# Patient Record
Sex: MALE | Race: OTHER | NOT HISPANIC OR LATINO | Employment: UNEMPLOYED | URBAN - METROPOLITAN AREA
[De-identification: names, ages, dates, MRNs, and addresses within clinical notes are randomized per-mention and may not be internally consistent; named-entity substitution may affect disease eponyms.]

---

## 2022-08-09 ENCOUNTER — OFFICE VISIT (OUTPATIENT)
Dept: PODIATRY | Facility: CLINIC | Age: 37
End: 2022-08-09

## 2022-08-09 VITALS — SYSTOLIC BLOOD PRESSURE: 141 MMHG | DIASTOLIC BLOOD PRESSURE: 93 MMHG | WEIGHT: 193 LBS | HEART RATE: 72 BPM

## 2022-08-09 DIAGNOSIS — L29.9 ITCH OF SKIN: Primary | ICD-10-CM

## 2022-08-09 DIAGNOSIS — B07.8 OTHER VIRAL WARTS: ICD-10-CM

## 2022-08-09 DIAGNOSIS — B35.1 ONYCHOMYCOSIS: ICD-10-CM

## 2022-08-09 DIAGNOSIS — L98.9 PSORIASIS-LIKE SKIN DISEASE: ICD-10-CM

## 2022-08-09 PROCEDURE — 99202 PR OFFICE/OUTPT VISIT, NEW, LEVL II, 15-29 MIN: ICD-10-PCS | Mod: 25,S$PBB,, | Performed by: PODIATRIST

## 2022-08-09 PROCEDURE — 17110 DESTRUCTION B9 LES UP TO 14: CPT | Mod: PBBFAC,PN | Performed by: PODIATRIST

## 2022-08-09 PROCEDURE — 99202 OFFICE O/P NEW SF 15 MIN: CPT | Mod: 25,S$PBB,, | Performed by: PODIATRIST

## 2022-08-09 PROCEDURE — 99999 PR PBB SHADOW E&M-NEW PATIENT-LVL III: ICD-10-PCS | Mod: PBBFAC,,, | Performed by: PODIATRIST

## 2022-08-09 PROCEDURE — 99999 PR PBB SHADOW E&M-NEW PATIENT-LVL III: CPT | Mod: PBBFAC,,, | Performed by: PODIATRIST

## 2022-08-09 PROCEDURE — 17110: ICD-10-PCS | Mod: S$PBB,,, | Performed by: PODIATRIST

## 2022-08-09 PROCEDURE — 99203 OFFICE O/P NEW LOW 30 MIN: CPT | Mod: PBBFAC,PN | Performed by: PODIATRIST

## 2022-08-09 NOTE — PROGRESS NOTES
Subjective:      Patient ID: Sanjay Encarnacion is a 36 y.o. male.    Chief Complaint: Foot Problem (Possible fungus)    Sanjay is a 36 y.o. male who presents to the clinic on referral from his daugther, Anneliese. He is complaining of itching R>L foot/arch & 'peeling thick skin' as well as thick discolored nails - has had it 10 yrs. On & off w/ OTC treatment. Recurrence last year of 'thick skin' on feet & then noticed larger lesions on knees also. 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 Sanjay is inquiring about treatment options.    History reviewed. No pertinent past medical history.   There is no problem list on file for this patient.    PCP: None     Objective:      Review of Systems   Constitutional: Negative for malaise/fatigue.   Cardiovascular: Negative for claudication and leg swelling.   Skin: Positive for color change, dry skin, itching, nail changes, rash and suspicious lesions. Negative for poor wound healing and unusual hair distribution.   Musculoskeletal: Negative for back pain, falls, joint pain, muscle weakness and myalgias.   Neurological: Negative for loss of balance, sensory change and weakness.   Psychiatric/Behavioral: The patient is not nervous/anxious.      Physical Exam  Vitals reviewed.   Constitutional:       General: He is in acute distress.      Appearance: He is well-developed, well-groomed and overweight.   Cardiovascular:      Pulses: Normal pulses.           Dorsalis pedis pulses are 2+ on the right side and 2+ on the left side.   Musculoskeletal:         General: No swelling, tenderness or signs of injury.      Right lower leg: No edema.      Left lower leg: No edema.   Feet:      Right foot:      Skin integrity: Callus and dry skin present. No skin breakdown or erythema.      Toenail Condition: Fungal disease present.     Left foot:      Skin integrity: Callus and dry skin present. No skin breakdown or erythema.      Toenail Condition: Fungal disease present.  Skin:     General: Skin  is warm and moist.      Capillary Refill: Capillary refill takes less than 2 seconds.      Findings: Lesion and rash present. No bruising or erythema. Rash is urticarial.      Comments: dome-shaped, wart-like patchy areas B/L ankle & knees w/ amorphous appearance & scaling; no erythema    Neurological:      Mental Status: He is alert and oriented to person, place, and time.      Sensory: No sensory deficit.      Motor: No weakness.      Gait: Gait normal.   Psychiatric:         Mood and Affect: Mood and affect normal. Mood is not anxious.         Behavior: Behavior normal. Behavior is cooperative.        Assessment:      Encounter Diagnoses   Name Primary?    Itch of skin Yes    Onychomycosis     Other viral warts     Psoriasis-like skin disease        Plan:       Sanjay was seen today for foot problem.    Diagnoses and all orders for this visit:    Itch of skin    Onychomycosis    Other viral warts    Psoriasis-like skin disease    I counseled the patient on his conditions, their implications and medical management. Differential Dx of verrucae vs psoriasis.    - Shoe inspection. Patient instructed on proper foot hygeine.     Use of OTC topical antiperspirant to B/L feet.    Instructions on use of OTC topical antifungal tx options for nails prn.     May use OTC topical steroid cream for itching.    Application of trichloracetic acid & Salinocaine w/ moleskin pads.     Follow-up 2-3 wks.prn.    May need referral to dermatology, pending next evaluation.

## 2022-08-09 NOTE — PATIENT INSTRUCTIONS
For your fungal infection:    1. Listerine mouthwash (yellow/gold) - soak for 5-10minutes daily (may re-use solution up to a week)    2. Lamisil (terbanafine) 1% antifungal cream daily after shower.         Dry solid antiperspirant to both feet after shower or soaking.    Over the counter steroid cream for itching.      Note:  Topical treatments come in many different forms, including lotions, shampoos and more. Two active ingredients, salicylic acid and coal tar, are approved by the FDA for the treatment of psoriasis and can be found in a variety of treatments. Other products may contain substances such as aloe vera, jojoba, zinc pyrithione and capsaicin, which are used to moisturize, soothe, remove scale or relieve itching.

## 2022-08-15 ENCOUNTER — TELEPHONE (OUTPATIENT)
Dept: PODIATRY | Facility: CLINIC | Age: 37
End: 2022-08-15

## 2022-08-15 NOTE — TELEPHONE ENCOUNTER
----- Message from Kylee Hilliard DPM sent at 8/12/2022 11:48 AM CDT -----  Regarding: RE: Prescription  Contact: [phone]  It is OTC.    ----- Message -----  From: Cornelia Mccloud LPN  Sent: 8/11/2022   3:17 PM CDT  To: Kylee Hilliard DPM  Subject: FW: Prescription                                 Are you sending Rx for Lamisil (terbanafine) 1% antifungal cream? Or just OTC  ----- Message -----  From: Cornelia Martin  Sent: 8/11/2022   3:08 PM CDT  To: [facility] MARTIN Staff  Subject: Prescription                                     Calling in regards getting prescription for Lamisil (terbanafine) 1% antifungal cream daily after shower sent to pharmacy. Please call to confirm.    [facility] [address]  [address] [place] [id number]  Phone: [phone] Fax: [phone]

## 2022-08-21 NOTE — PROCEDURES
Destruction of Benign Lesion/wart vs psoriatic plaques    Date/Time: 8/9/2022 11:00 AM  Performed by: Kylee Hilliard DPM  Authorized by: Kylee Hilliard DPM     Consent Done?:  Yes (Verbal)  Indications:     other  Procedure Details:     Cosmetic?: No      Number of lesions:  2    Destruction method:  Other    Sterile dressings:  Other (comments)    Bleeding:  None     Patient tolerated the procedure well with no immediate complications.   Chemical cautery includes Trichloracetic acid & Salinocaine under occlusion w/ a moleskin pad. Patient to keep area CDI 48 hrs.to tolerance. Return for serial treatment until resolution prn.

## 2022-08-30 ENCOUNTER — TELEPHONE (OUTPATIENT)
Dept: PODIATRY | Facility: CLINIC | Age: 37
End: 2022-08-30

## 2022-08-30 NOTE — TELEPHONE ENCOUNTER
----- Message from Ana Vaca sent at 8/30/2022 10:48 AM CDT -----  Patient states prescription was never called in. Please call him. Thanks

## 2022-08-31 ENCOUNTER — OFFICE VISIT (OUTPATIENT)
Dept: PODIATRY | Facility: CLINIC | Age: 37
End: 2022-08-31

## 2022-08-31 DIAGNOSIS — L29.9 ITCHING: ICD-10-CM

## 2022-08-31 DIAGNOSIS — L98.9 PSORIASIS-LIKE SKIN DISEASE: ICD-10-CM

## 2022-08-31 DIAGNOSIS — B07.8 OTHER VIRAL WARTS: Primary | ICD-10-CM

## 2022-08-31 DIAGNOSIS — B35.3 TINEA PEDIS OF BOTH FEET: ICD-10-CM

## 2022-08-31 PROCEDURE — 99213 OFFICE O/P EST LOW 20 MIN: CPT | Mod: S$PBB,,, | Performed by: PODIATRIST

## 2022-08-31 PROCEDURE — 99211 OFF/OP EST MAY X REQ PHY/QHP: CPT | Mod: PBBFAC,PN | Performed by: PODIATRIST

## 2022-08-31 PROCEDURE — 99999 PR PBB SHADOW E&M-EST. PATIENT-LVL I: CPT | Mod: PBBFAC,,, | Performed by: PODIATRIST

## 2022-08-31 PROCEDURE — 99213 PR OFFICE/OUTPT VISIT, EST, LEVL III, 20-29 MIN: ICD-10-PCS | Mod: S$PBB,,, | Performed by: PODIATRIST

## 2022-08-31 PROCEDURE — 99999 PR PBB SHADOW E&M-EST. PATIENT-LVL I: ICD-10-PCS | Mod: PBBFAC,,, | Performed by: PODIATRIST

## 2022-08-31 RX ORDER — CLOTRIMAZOLE AND BETAMETHASONE DIPROPIONATE 10; .64 MG/G; MG/G
CREAM TOPICAL 2 TIMES DAILY
Qty: 45 G | Refills: 1 | Status: SHIPPED | OUTPATIENT
Start: 2022-08-31

## 2022-08-31 RX ORDER — CALCIPOTRIENE, BETAMETHASONE DIPROPIONATE 50; .643 UG/G; MG/G
OINTMENT TOPICAL DAILY
Qty: 100 G | Refills: 1 | Status: SHIPPED | OUTPATIENT
Start: 2022-08-31

## 2022-08-31 NOTE — PROGRESS NOTES
Subjective:      Patient ID: Sanjay Encarnacion is a 36 y.o. male.    Chief Complaint: No chief complaint on file.    Sanjay is a 36 y.o. male who presents for follow-up of itching R>L foot/arch & 'peeling thick skin' as well as thick discolored nails - has had it 10 yrs. On & off w/ OTC treatment. Recurrence last year of 'thick skin' on feet  & then noticed larger lesions on knees also. 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  Patient remembers that there were 2 things that he needed to do but thought they were prescriptions so has not been doing anything thus far-it was supposed to be a topical antiperspirant for hyperhidrosis and antifungal. Still has itching but improved appearance to areas of concern status post topical chemical cautery last visit.    No past medical history on file.   There is no problem list on file for this patient.    PCP: None     Objective:      Physical Exam  Vitals reviewed.   Constitutional:       Appearance: He is well-developed, well-groomed and overweight.   Cardiovascular:      Pulses: Normal pulses.           Dorsalis pedis pulses are 2+ on the right side and 2+ on the left side.   Musculoskeletal:         General: No swelling or tenderness.   Feet:      Right foot:      Skin integrity: Dry skin present. No skin breakdown, erythema or callus. Right foot fissure: Left knee.     Toenail Condition: Fungal disease present.     Left foot:      Skin integrity: Callus and dry skin present. No skin breakdown or erythema.      Toenail Condition: Fungal disease present.  Skin:     General: Skin is warm and moist.      Capillary Refill: Capillary refill takes less than 2 seconds.      Findings: Lesion and rash present. No bruising or erythema. Rash is urticarial.      Comments: Reduced dome-shaped, wart-like patchy areas B/L ankle & knees w/ amorphous appearance & scaling; no erythema.  Remaining area concern left knee is drier and flat.  Medial right ankle/heel with remaining localized erythema  and pruritus but no drainage nor blistering.     Neurological:      Mental Status: He is alert and oriented to person, place, and time.      Sensory: No sensory deficit.      Motor: No weakness.      Gait: Gait normal.   Psychiatric:         Mood and Affect: Mood and affect normal.         Behavior: Behavior normal. Behavior is cooperative.      Assessment:      Encounter Diagnoses   Name Primary?    Other viral warts Yes    Psoriasis-like skin disease     Tinea pedis of both feet     Itching        Plan:       Diagnoses and all orders for this visit:    Other viral warts    Psoriasis-like skin disease  -     calcipotriene-betamethasone (TACLONEX) ointment; Apply topically once daily. To left knee    Tinea pedis of both feet  -     clotrimazole-betamethasone 1-0.05% (LOTRISONE) cream; Apply topically 2 (two) times daily. To right foot    Itching  -     calcipotriene-betamethasone (TACLONEX) ointment; Apply topically once daily. To left knee  -     clotrimazole-betamethasone 1-0.05% (LOTRISONE) cream; Apply topically 2 (two) times daily. To right foot    I counseled the patient on his conditions, their implications and medical management.     Use of OTC topical antiperspirant to B/L feet.    Follow-up prn.

## 2022-08-31 NOTE — PATIENT INSTRUCTIONS
For your fungal(mycotic) nails, pick one & use for at least 3-6 months:    1. Listerine mouthwash (yellow/gold) - soak for 5-10minutes daily (may re-use solution up to a week)    2. Vicks Vaporub to nails daily after a bath/end of day/bedtime.    3. Lamisil (terbanafine) 1% antifungal cream daily to nails after shower.       Use men's solid dry  topical anti-perspirant (unscented) after shower qd.

## 2022-09-02 ENCOUNTER — PATIENT MESSAGE (OUTPATIENT)
Dept: OTOLARYNGOLOGY | Facility: CLINIC | Age: 37
End: 2022-09-02

## 2023-04-19 ENCOUNTER — PATIENT MESSAGE (OUTPATIENT)
Dept: RESEARCH | Facility: HOSPITAL | Age: 38
End: 2023-04-19